# Patient Record
Sex: MALE | Race: AMERICAN INDIAN OR ALASKA NATIVE
[De-identification: names, ages, dates, MRNs, and addresses within clinical notes are randomized per-mention and may not be internally consistent; named-entity substitution may affect disease eponyms.]

---

## 2020-03-23 ENCOUNTER — HOSPITAL ENCOUNTER (EMERGENCY)
Dept: HOSPITAL 5 - ED | Age: 45
LOS: 1 days | Discharge: HOME | End: 2020-03-24
Payer: SELF-PAY

## 2020-03-23 DIAGNOSIS — E11.9: ICD-10-CM

## 2020-03-23 DIAGNOSIS — Z79.899: ICD-10-CM

## 2020-03-23 DIAGNOSIS — N39.0: ICD-10-CM

## 2020-03-23 DIAGNOSIS — K80.20: Primary | ICD-10-CM

## 2020-03-23 DIAGNOSIS — E86.0: ICD-10-CM

## 2020-03-23 LAB
ALBUMIN SERPL-MCNC: 3.8 G/DL (ref 3.9–5)
ALT SERPL-CCNC: 17 UNITS/L (ref 7–56)
BASOPHILS # (AUTO): 0.1 K/MM3 (ref 0–0.1)
BASOPHILS NFR BLD AUTO: 0.9 % (ref 0–1.8)
BILIRUB UR QL STRIP: (no result)
BLOOD UR QL VISUAL: (no result)
BUN SERPL-MCNC: 21 MG/DL (ref 9–20)
BUN/CREAT SERPL: 11 %
CALCIUM SERPL-MCNC: 9.4 MG/DL (ref 8.4–10.2)
EOSINOPHIL # BLD AUTO: 0.2 K/MM3 (ref 0–0.4)
EOSINOPHIL NFR BLD AUTO: 2.3 % (ref 0–4.3)
HCT VFR BLD CALC: 38 % (ref 35.5–45.6)
HEMOLYSIS INDEX: 6
HGB BLD-MCNC: 12.8 GM/DL (ref 11.8–15.2)
LYMPHOCYTES # BLD AUTO: 2.7 K/MM3 (ref 1.2–5.4)
LYMPHOCYTES NFR BLD AUTO: 30.2 % (ref 13.4–35)
MCHC RBC AUTO-ENTMCNC: 34 % (ref 32–34)
MCV RBC AUTO: 85 FL (ref 84–94)
MONOCYTES # (AUTO): 0.7 K/MM3 (ref 0–0.8)
MONOCYTES % (AUTO): 7.4 % (ref 0–7.3)
PH UR STRIP: 5 [PH] (ref 5–7)
PLATELET # BLD: 231 K/MM3 (ref 140–440)
RBC # BLD AUTO: 4.47 M/MM3 (ref 3.65–5.03)
RBC #/AREA URNS HPF: 5 /HPF (ref 0–6)
UROBILINOGEN UR-MCNC: < 2 MG/DL (ref ?–2)
WBC #/AREA URNS HPF: 8 /HPF (ref 0–6)

## 2020-03-23 PROCEDURE — 80053 COMPREHEN METABOLIC PANEL: CPT

## 2020-03-23 PROCEDURE — 36415 COLL VENOUS BLD VENIPUNCTURE: CPT

## 2020-03-23 PROCEDURE — 87086 URINE CULTURE/COLONY COUNT: CPT

## 2020-03-23 PROCEDURE — 96361 HYDRATE IV INFUSION ADD-ON: CPT

## 2020-03-23 PROCEDURE — 74176 CT ABD & PELVIS W/O CONTRAST: CPT

## 2020-03-23 PROCEDURE — 83690 ASSAY OF LIPASE: CPT

## 2020-03-23 PROCEDURE — 85025 COMPLETE CBC W/AUTO DIFF WBC: CPT

## 2020-03-23 PROCEDURE — 96375 TX/PRO/DX INJ NEW DRUG ADDON: CPT

## 2020-03-23 PROCEDURE — 81001 URINALYSIS AUTO W/SCOPE: CPT

## 2020-03-23 PROCEDURE — 96374 THER/PROPH/DIAG INJ IV PUSH: CPT

## 2020-03-23 PROCEDURE — 99284 EMERGENCY DEPT VISIT MOD MDM: CPT

## 2020-03-23 NOTE — EMERGENCY DEPARTMENT REPORT
Blank Doc





- Documentation


Documentation: 





44-year-old male that presents with right flank pain.





This initial assessment/diagnostic orders/clinical plan/treatment(s) is/are 

subject to change based on patient's health status, clinical progression and re-

assessment by fellow clinical providers in the ED.  Further treatment and workup

at subsequent clinical providers discretion.  Patient/guardians urged not to 

elope from the ED as their condition may be serious if not clinically assessed 

and managed.  Initial orders include:


1- Patient sent to [facility] for further evaluation and treatment


2- UA

## 2020-03-24 VITALS — DIASTOLIC BLOOD PRESSURE: 98 MMHG | SYSTOLIC BLOOD PRESSURE: 158 MMHG

## 2020-03-24 NOTE — CAT SCAN REPORT
CT abdomen pelvis wo con 



INDICATION / CLINICAL INFORMATION:

RENAL STONE PROTOCOL!!  Right sided flank / abd pain..



TECHNIQUE:

All CT scans at this location are performed using CT dose reduction for ALARA by means of automated e
xposure control. 



COMPARISON:

None available.



FINDINGS:

No free fluid is seen in the abdomen. No urinary tract stones are present. High density material is s
een in the gallbladder most likely representing numerous small stones. The liver, spleen, pancreas, a
drenal glands and great vessels are normal. No enlarged mesenteric or retroperitoneal lymph nodes are
 seen



In the pelvis, no free fluid is seen. No enlarged lymph nodes are identified. The bladder and the fredy
endix are normal.



IMPRESSION:

1. High density material in the gallbladder most likely representing numerous small stones

2. No acute findings



Signer Name: Osito Hernandez MD FACR 

Signed: 3/24/2020 12:13 AM

Workstation Name: [id number]

## 2020-03-24 NOTE — EMERGENCY DEPARTMENT REPORT
ED Abdominal Pain HPI





- General


Chief Complaint: Back Pain/Injury


Stated Complaint: BACK PAIN, STOMACH


Time Seen by Provider: 20 18:56


Source: patient


Mode of arrival: Ambulatory


Limitations: No Limitations





- History of Present Illness


Initial Comments: 


Mr. Roy is a 45 y/o aam with hx of DMII, and HTN ,who presents of flank, back 

, and abd pain x 4 days hx of same 6 months, ago pt complains of associated 

nausea and vomiiting, symptoms are exacerbated by po intake, symptoms are 

relieved by nothing tried. pt denies fever or chills. 


MD Complaint: abdominal pain, flank pain


Onset/Timin


-: days(s)


Location: R flank


Radiation: back


Migration to: no migration


Severity: moderate


Severity scale (0 -10): 6


Quality: aching, sharp


Consistency: intermittent


Improves With: nothing


Worsens With: eating, movement, other (voiding)


Associated Symptoms: nausea, vomiting, dysuria





- Related Data


                                  Previous Rx's











 Medication  Instructions  Recorded  Last Taken  Type


 


Ciprofloxacin HCl [Ciprofloxacin 500 mg PO BID 10 Days #20 tab 20 Unknown 

Rx





TAB]    


 


Omeprazole 40 mg PO DAILY #30 capsule. 20 Unknown Rx











                                    Allergies











Allergy/AdvReac Type Severity Reaction Status Date / Time


 


No Known Allergies Allergy   Unverified 20 23:46














ED Review of Systems


ROS: 


Stated complaint: BACK PAIN, STOMACH


Other details as noted in HPI





Constitutional: denies: chills, fever


Eyes: denies: eye pain, eye discharge, vision change


ENT: denies: ear pain, throat pain


Respiratory: denies: cough, shortness of breath, wheezing


Cardiovascular: denies: chest pain, palpitations


Endocrine: no symptoms reported


Gastrointestinal: abdominal pain, nausea, vomiting.  denies: diarrhea, 

constipation, melena


Genitourinary: urgency, dysuria, frequency.  denies: hematuria, discharge


Musculoskeletal: back pain


Skin: denies: rash, lesions


Neurological: denies: headache, weakness, paresthesias


Psychiatric: denies: anxiety, depression


Hematological/Lymphatic: denies: easy bleeding, easy bruising





ED Past Medical Hx





- Past Medical History


Previous Medical History?: Yes


Hx Diabetes: Yes





- Surgical History


Past Surgical History?: No





- Social History


Smoking Status: Never Smoker


Substance Use Type: None





- Medications


Home Medications: 


                                Home Medications











 Medication  Instructions  Recorded  Confirmed  Last Taken  Type


 


Ciprofloxacin HCl [Ciprofloxacin 500 mg PO BID 10 Days #20 tab 20  Unknown

 Rx





TAB]     


 


Omeprazole 40 mg PO DAILY #30 capsule. 20  Unknown Rx














ED Physical Exam





- General


Limitations: No Limitations


General appearance: alert, in no apparent distress





- Head


Head exam: Present: atraumatic, normocephalic, normal inspection





- Eye


Eye exam: Present: normal appearance, PERRL, EOMI


Pupils: Present: normal accommodation





- ENT


ENT exam: Present: mucous membranes moist





- Neck


Neck exam: Present: normal inspection, tenderness, full ROM.  Absent: lymphad

enopathy





- Respiratory


Respiratory exam: Present: normal lung sounds bilaterally.  Absent: respiratory 

distress, wheezes, stridor, chest wall tenderness





- Cardiovascular


Cardiovascular Exam: Present: regular rate, normal rhythm, normal heart sounds. 

Absent: systolic murmur, diastolic murmur, rubs, gallop





- GI/Abdominal


GI/Abdominal exam: Present: soft, tenderness (super pubic right flank), normal 

bowel sounds.  Absent: distended, guarding, rebound, rigid, bruit, hernia





- Rectal


Rectal exam: Present: deferred





- Extremities Exam


Extremities exam: Present: normal inspection, full ROM, normal capillary refill.

 Absent: tenderness





- Back Exam


Back exam: Present: normal inspection, full ROM, tenderness, CVA tenderness (R).

 Absent: CVA tenderness (L), vertebral tenderness, rash noted





- Neurological Exam


Neurological exam: Present: alert, oriented X3, CN II-XII intact, normal gait





- Psychiatric


Psychiatric exam: Present: normal affect, normal mood





- Skin


Skin exam: Present: warm, dry, intact, normal color.  Absent: rash





ED Course


                                   Vital Signs











  20





  18:56 23:46


 


Temperature 97.9 F 


 


Pulse Rate 97 H 


 


Respiratory 18 16





Rate  


 


Blood Pressure 167/112 


 


O2 Sat by Pulse 98 





Oximetry  














ED Medical Decision Making





- Lab Data


Result diagrams: 


                                 20 19:13





                                 20 19:13








Labs











  20





  19:13 19:13 Unknown


 


WBC  8.9  


 


RBC  4.47  


 


Hgb  12.8  


 


Hct  38.0  


 


MCV  85  


 


MCH  29  


 


MCHC  34  


 


RDW  14.2  


 


Plt Count  231  


 


Lymph % (Auto)  30.2  


 


Mono % (Auto)  7.4 H  


 


Eos % (Auto)  2.3  


 


Baso % (Auto)  0.9  


 


Lymph #  2.7  


 


Mono #  0.7  


 


Eos #  0.2  


 


Baso #  0.1  


 


Seg Neutrophils %  59.2  


 


Seg Neutrophils #  5.3  


 


Sodium   140 


 


Potassium   4.3 


 


Chloride   100.2 


 


Carbon Dioxide   25 


 


Anion Gap   19 


 


BUN   21 H 


 


Creatinine   1.9 H 


 


Estimated GFR   39 


 


BUN/Creatinine Ratio   11 


 


Glucose   167 H 


 


Calcium   9.4 


 


Total Bilirubin   0.60 


 


AST   16 


 


ALT   17 


 


Alkaline Phosphatase   83 


 


Total Protein   8.3 H 


 


Albumin   3.8 L 


 


Albumin/Globulin Ratio   0.8 


 


Lipase   581 H 


 


Urine Color    Yellow


 


Urine Turbidity    Clear


 


Urine pH    5.0


 


Ur Specific Gravity    1.016


 


Urine Protein    100 mg/dl


 


Urine Glucose (UA)    150


 


Urine Ketones    Neg


 


Urine Blood    Sm


 


Urine Nitrite    Neg


 


Urine Bilirubin    Neg


 


Urine Urobilinogen    < 2.0


 


Ur Leukocyte Esterase    Neg


 


Urine WBC (Auto)    8.0 H


 


Urine RBC (Auto)    5.0














- Radiology Data


Radiology results: report reviewed, image reviewed


Findings


Reporting MD: Osito Hernandez Dictation Time: 2020 23:13 

: Not available Transcription Date:


 


CT abdomen pelvis wo con  


 


INDICATION / CLINICAL INFORMATION:  RENAL STONE PROTOCOL!! Right sided flank / a

bd pain..  


 


TECHNIQUE:  All CT scans at this location are performed using CT dose reduction 

for ALARA by means of automated exposure control.  


 


COMPARISON:  None available.  


 


FINDINGS:  No free fluid is seen in the abdomen. No urinary tract stones are 

present. High density material is seen in the gallbladder most likely 

representing numerous small stones. The liver, spleen, pancreas, adrenal glands 

and great vessels are normal. No enlarged mesenteric or retroperitoneal lymph 

nodes are seen  


 


In the pelvis, no free fluid is seen. No enlarged lymph nodes are identified. 

The bladder and the appendix are normal.  


 


IMPRESSION:  1. High density material in the gallbladder most likely rep

resenting numerous small stones  2. No acute findings  


 


Signer Name: Osito Hernandez MD FACR  Signed: 3/23/2020 11:13 PM  Workstation 

Name: [id number]








- Medical Decision Making


CT demonstrates gallstones, normal pancreas, no cholecystitis no colitis, Lipase

583, patient is tolerating p.o. intake without nausea vomiting.  CBC is normal. 

UA positive for leukocytes.  Plan Cipro, omeprazole, naproxen as needed pain, 

patient will follow-up with PCP in 2 to 3 days, patient given referral to 

[facility].  Patient states pain is 1/10 at this time and ready f

or discharge.  Patient is DC to home in stable condition at this time.


Critical care attestation.: 


If time is entered above; I have spent that time in minutes in the direct care 

of this critically ill patient, excluding procedure time.








ED Disposition


Clinical Impression: 


 Mild dehydration





Cholelithiasis


Qualifiers:


 Cholelithiasis location: gallbladder Cholecystitis presence: without 

cholecystitis Biliary obstruction: without biliary obstruction Qualified 

Code(s): K80.20 - Calculus of gallbladder without cholecystitis without 

obstruction





UTI (urinary tract infection)


Qualifiers:


 Urinary tract infection type: acute cystitis Hematuria presence: without 

hematuria Qualified Code(s): N30.00 - Acute cystitis without hematuria





Disposition: DC-01 TO HOME OR SELFCARE


Is pt being admited?: No


Does the pt Need Aspirin: No


Condition: Stable


Instructions:  Cholelithiasis (ED), Dehydration (ED)


Additional Instructions: 


take bp medication as prescribed, Stop ETOH, Take medications as prescribed, 

follolw up with Gastrenterology , follow up with your primary care doctor. 

hydrate as discussed. 


Prescriptions: 


Ciprofloxacin HCl [Ciprofloxacin TAB] 500 mg PO BID 10 Days #20 tab


Omeprazole 40 mg PO DAILY #30 capsule.


Referrals: 


PRIMARY CARE,MD [Primary Care Provider] - 3-5 Days


Forms:  Work/School Release Form(ED)


Time of Disposition: 03:26

## 2021-05-19 NOTE — CAT SCAN REPORT
CT HEAD WITHOUT CONTRAST



INDICATION : 

Stroke symptoms.



TECHNIQUE:  Axial, coronal and sagittal CT imaging was performed from the skull apex through the skul
l base without contrast.  All CT scans at this location are performed using CT dose reduction for ALA
RA by means of automated exposure control. 



COMPARISON:  None available.



FINDINGS:  



PARENCHYMA:  Acute intraparenchymal hematoma is seen along the left parietal lobe on image 20 of seri
es 2 measuring 2.5 x 2.1 cm with mild surrounding edema. No other sites of hemorrhage. No extra-axial
 collection. No mass or midline shift.

VENTRICLES:  Symmetric and normal in size.  

SOFT TISSUES:  No significant abnormality of the included soft tissues/orbits.  

BONES:  No acute osseous abnormality.   

SINUSES: No significant abnormality. 

ADDITIONAL FINDINGS: None. 



IMPRESSION: 

Acute left parietal intraparenchymal hemorrhage as above.





============================================

CODE STROKE:

Time of Communication (CST/CDT): 01:20

Licensed Practitioner Receiving Report: Dr. Blank 







============================================



Signer Name: Ash Moreno MD 

Signed: 5/19/2021 2:21 AM

Workstation Name: [id number]

## 2021-05-19 NOTE — EMERGENCY DEPARTMENT REPORT
ED Neuro Deficit HPI





- General


Stated Complaint: STROKE


Time Seen by Provider: 05/19/21 01:53


Source: patient, EMS





- History of Present Illness


Initial Comments: 





Patient is 46 years old male with history of hypertension and diabetes.  Patient

brought to the emergency room as a code stroke.  Patient symptoms started 

approximately 2 hours prior to coming to the emergency room with a sudden onset 

of inability to speak and right facial droop.  Patient denied any headache or 

neck pain.  Patient denied any visual changes.  No ataxia.  No extremity 

weakness.





Patient immediately moved to CT for stat CT brain without contrast.  Stroke 

telemetry neurology Dr. Kahkeshani immediately consulted.  He examined the 

patient through video conference.  CT brain showed intracranial bleed.  He 

advised to start patient on Cardene drip and gave patient 1 g of Keppra and 

patient to be transferred to neurosurgery center.


-: Sudden, hour(s) (2)


Location: speech, right face


Presenting Symptoms: Present: Facial Droop/Numbness, Unable to Speak Clearly


History of same: No


Place: home


Context: sudden onset





- Related Data


Home Medications: 


                                  Previous Rx's











 Medication  Instructions  Recorded  Last Taken  Type


 


Ciprofloxacin HCl [Ciprofloxacin 500 mg PO BID 10 Days #20 tab 03/24/20 Unknown 

Rx





TAB]    


 


Omeprazole 40 mg PO DAILY #30 capsule. 03/24/20 Unknown Rx











Allergies/Adverse Reactions: 


                                    Allergies











Allergy/AdvReac Type Severity Reaction Status Date / Time


 


No Known Allergies Allergy   Verified 05/19/21 02:17














ED Review of Systems


ROS: 


Stated complaint: STROKE


Other details as noted in HPI





Comment: All other systems reviewed and negative


Constitutional: denies: chills, fever


Respiratory: denies: cough, shortness of breath


Cardiovascular: denies: chest pain, palpitations


Gastrointestinal: denies: abdominal pain


Musculoskeletal: denies: back pain


Neurological: denies: headache, weakness





ED Past Medical Hx





- Past Medical History


Hx Diabetes: Yes





- Social History


Smoking Status: Never Smoker


Substance Use Type: None





- Medications


Home Medications: 


                                Home Medications











 Medication  Instructions  Recorded  Confirmed  Last Taken  Type


 


Ciprofloxacin HCl [Ciprofloxacin 500 mg PO BID 10 Days #20 tab 03/24/20  Unknown

 Rx





TAB]     


 


Omeprazole 40 mg PO DAILY #30 capsule. 03/24/20  Unknown Rx














ED Neuro Physical Exam





- General


General appearance: alert, in no apparent distress


Suspected Stroke: Yes





- Head


Head exam: Present: atraumatic, normocephalic, normal inspection





- Eye


Eye exam: Present: normal appearance, PERRL





- ENT


ENT exam: Present: normal exam, normal orophraynx, mucous membranes moist





- Neck


Neck exam: Present: normal inspection, full ROM.  Absent: tenderness, 

meningismus





- Respiratory


Respiratory exam: Present: normal lung sounds bilaterally





- Cardiovascular


Cardiovascular Exam: Present: regular rate, normal rhythm, normal heart sounds





- GI/Abdominal


GI/Abdominal exam: Present: soft, normal bowel sounds.  Absent: distended, 

tenderness, guarding, rebound, rigid, organomegaly, mass, bruit, pulsatile mass,

hernia





- Extremities Exam


Extremities exam: Present: normal inspection, full ROM, normal capillary refill.

 Absent: tenderness





- Back Exam


Back exam: Present: normal inspection, full ROM.  Absent: CVA tenderness (R), 

CVA tenderness (L)





- Neurological Exam


Neurological exam: Present: alert, oriented X3.  Absent: CN II-XII intact, motor

sensory deficit





- NIHSS


Assessment Interval: Baseline


1a. Level of Consciousness: alert/keenly responsive


1b. LOC Questions: answers no questions correctly


1c. LOC Commands: performs tasks correctly


2. Best Gaze: normal


3. Visual: no visual loss


4. Facial Palsy: partial paralysis


5b. Motor Arm Right: no drift


5a. Motor Arm Left: no drift


6a. Motor Leg Left: no drift


6b. Motor Leg Right: no drift


7. Limb Ataxia: absent


8. Sensory: normal


9. Best Language: mute/global aphasia


10. Dysarthria: normal


11. Extinction/Inattention: no abnormality


Total Score: 7


Stroke Severity: Moderate Stroke





- Psychiatric


Psychiatric exam: Present: flat affect





- Skin


Skin exam: Present: warm, dry, intact





ED Course


                                   Vital Signs











  05/19/21





  02:25


 


Temperature 99.1 F


 


Pulse Rate 110 H


 


Respiratory 16





Rate 


 


Blood Pressure 207/107





[Left] 


 


O2 Sat by Pulse 99





Oximetry 














- Consultations


Consultation #1: 





05/19/21 02:33


I discussed the patient with Dr. Brown, neurologist at [facility]

 and he stated that they do not have any beds available and advised to try other

 facilities.





- Lab Data


Result diagrams: 


                                 05/19/21 02:24





                                   Lab Results











  05/19/21 Range/Units





  02:24 


 


WBC  10.7  (4.5-11.0)  K/mm3


 


RBC  3.73  (3.65-5.03)  M/mm3


 


Hgb  11.0 L  (11.8-15.2)  gm/dl


 


Hct  31.7 L  (35.5-45.6)  %


 


MCV  85  (84-94)  fl


 


MCH  29  (28-32)  pg


 


MCHC  35 H  (32-34)  %


 


RDW  14.4  (13.2-15.2)  %


 


Plt Count  202  (140-440)  K/mm3


 


Lymph % (Auto)  21.8  (13.4-35.0)  %


 


Mono % (Auto)  5.6  (0.0-7.3)  %


 


Eos % (Auto)  0.9  (0.0-4.3)  %


 


Baso % (Auto)  0.5  (0.0-1.8)  %


 


Lymph # (Auto)  2.3  (1.2-5.4)  K/mm3


 


Mono # (Auto)  0.6  (0.0-0.8)  K/mm3


 


Eos # (Auto)  0.1  (0.0-0.4)  K/mm3


 


Baso # (Auto)  0.1  (0.0-0.1)  K/mm3


 


Seg Neutrophils %  71.2 H  (40.0-70.0)  %


 


Seg Neutrophils #  7.6  (1.8-7.7)  K/mm3














- EKG Data


-: EKG Interpreted by Me


EKG shows normal: sinus rhythm


Rate: normal


Interpretation: no acute changes





- Radiology Data


Radiology results: report reviewed





- Medical Decision Making





Patient is 46 years old male with history of hypertension and diabetes.  Patient

 brought to the emergency room as a code stroke.  Patient symptoms started 

approximately 2 hours prior to coming to the emergency room with a sudden onset 

of inability to speak and right facial droop.  Patient denied any headache or 

neck pain.  Patient denied any visual changes.  No ataxia.  No extremity 

weakness.





Patient immediately moved to CT for stat CT brain without contrast.  Stroke 

telemetry neurology Dr. Kahkeshani immediately consulted.  He examined the 

patient through video conference.  CT brain showed intracranial bleed.  He 

advised to start patient on Cardene drip and gave patient 1 g of Keppra and 

patient to be transferred to neurosurgery center.





I discussed the patient with Dr. Cruz at [facility], he accepted the 

patient to be transfer for further management.


Critical Care Time: Yes


Critical care time in (mins) excluding proc time.: 45


Critical care attestation.: 


If time is entered above; I have spent that time in minutes in the direct care 

of this critically ill patient, excluding procedure time.








ED Disposition


Clinical Impression: 


 Intracranial hemorrhage





Disposition: DC/TX-70 ANOTHER TYPE HLTHCARE


Is pt being admited?: No


Condition: Stable


Referrals: 


PRIMARY CARE,MD [Primary Care Provider] - 3-5 Days

## 2021-05-19 NOTE — CAT SCAN REPORT
CT angio neck



INDICATION / CLINICAL INFORMATION:

46 years Male; CODE STROKE PROTOCOL!!  Stroke-Like Symptoms. 



TECHNIQUE: Thin cut axial images obtained through the head during IV bolus contrast administration. S
agittal, coronal, and 3 plane MIP reconstructions performed by the technologist. NASCET type criteria
 used evaluate stenoses. All CT scans at this location are performed using CT dose reduction for ALAR
A by means of automated exposure control. 



COMPARISON:

None available.



FINDINGS: 



CAROTID ARTERIES: There is minimal atherosclerotic plaque with small focus of calcification involving
 right carotid bifurcation. However, there is no significant stenosis of the carotid arteries bilater
ally by NASCET criteria. There are small focus of calcification involving distal left common carotid 
artery also without significant stenosis.



VERTEBRAL ARTERIES: The motion and beam hardening degrade the image quality. However, there is no sig
nificant stenosis involving the vertebral arteries bilaterally. 



ARCH: There is no significant stenosis involving arch vessels.



ADDITIONAL FINDINGS: Remainder of the surrounding soft tissues are grossly normal. 



IMPRESSION:



There is no significant stenosis involving carotid or vertebral arteries by NASCET criteria.



Signer Name: Milton Peres MD 

Signed: 5/19/2021 2:47 AM

Workstation Name: [id number]

## 2021-05-19 NOTE — CAT SCAN REPORT
CT angio head



INDICATION / CLINICAL INFORMATION:

46 years Male; CODE STROKE PROTOCOL!!  Stroke-Like Symptoms. 



TECHNIQUE: Thin cut axial images obtained through the head during IV bolus contrast administration. S
agittal, coronal, and 3 plane MIP reconstructions performed by the technologist. NASCET type criteria
 used evaluate stenoses. Automated exposure control utilized for radiation reduction purposes. 



COMPARISON: 

None available.



FINDINGS:



INTERNAL CAROTID ARTERIES: There is no significant focal stenosis involving distal internal carotid a
rteries by NASCET criteria.



VERTEBROBASILAR SYSTEM: There are foci of calcification involving proximal intracranial vertebral art
eries without significant stenosis at. The basilar artery is unremarkable without focal narrowing.



CEREBRAL ARTERIES: There is no significant stenosis involving the proximal cerebral arteries or adjac
ent segments.



The findings correlate with the earlier CT head demonstrating an acute hemorrhage along the posterior
 left frontal lobe. However, this finding appears to increase in size given the short interval curren
tly measuring 2.4 cm AP by 2.6 cm transverse compared to 2.1 x 2.4 cm in respective projections on th
e earlier study. Additionally, there are not small foci of relative increased attenuation along the i
nferior portion which appears reflect extravasation of contrast and active bleeding. There is continu
ed mass effect with surrounding edema and sulcal effacement. The findings also result in mild displac
ement of the vessels within this region.



ANEURYSM: There is no clear CTA evidence of intracranial aneurysm.



ADDITIONAL FINDINGS: There is again note of irregular dural calcification along the frontal convexiti
es of bilaterally. 



IMPRESSION:

There appears be mild interval increase in size of the acute hematoma along the posterior left fronta
l lobe from the earlier CT head as detailed above. There are small foci of increased attenuation dirk
g the inferior margin of the hematoma indicative of extravasation of contrast and active bleeding.



There is no significant focal stenosis involving intracranial vessels. The above hematoma results in 
milder displacement of the vessels along the lateral frontoparietal region.



Signer Name: Milton Peres MD 

Signed: 5/19/2021 3:02 AM

Workstation Name: [id number]

## 2021-05-19 NOTE — XRAY REPORT
CHEST 1 VIEW 5/19/2021 1:23 AM



INDICATION / CLINICAL INFORMATION:

stroke.



COMPARISON: 

None available.



FINDINGS:



SUPPORT DEVICES: None.

HEART / MEDIASTINUM: No significant abnormality. 

LUNGS / PLEURA: Clear lungs. No significant pleural effusion. No pneumothorax. 



ADDITIONAL FINDINGS: No significant additional findings.



IMPRESSION:

1. No acute abnormality of the chest.



Signer Name: Ash Moreno MD 

Signed: 5/19/2021 2:29 AM

Workstation Name: [id number]

## 2021-05-19 NOTE — ELECTROCARDIOGRAPH REPORT
[facility]

                                       

Test Date:    2021               Test Time:    02:14:20

Pat Name:     CHARLES GASPAR              Department:   

Patient ID:   [id number]          Room:          

Gender:       M                        Technician:   MARLENY

:          1975               Requested By: DAREK SNELL

Order Number: [id number]              Reading MD:   Emmanuel Mathews

                                 Measurements

Intervals                              Axis          

Rate:         111                      P:            66

WY:           118                      QRS:          9

QRSD:         107                      T:            185

QT:           339                                    

QTc:          463                                    

                           Interpretive Statements

Sinus tachycardia

Multiple ventricular premature complexes

Probable left atrial enlargement

LVH with secondary repolarization abnormality

No previous ECG available for comparison

Electronically Signed On 2021 10:51:15 EDT by Emmanuel Mathews

## 2021-05-19 NOTE — EMERGENCY DEPARTMENT REPORT
HPI





- General


Time Seen by Provider: 05/19/21 01:53





- HPI


HPI: 





[facility] Consult Note





# Demographics


Consult Type: Acute Stroke Level 1 (0-4.5 hrs)


Patient Location: Emergency Room


First Name: Freddy


Last Name: Kirill


YOB: 1975


Age: 46


Gender: Male


Time of Initial Page (Eastern Time): 05/19/2021, 01:50


Time of Return Call (Eastern Time): 05/19/2021, 01:51





# HPI


History: 47 yo hx of DM on insulin.


EMS noted 2 hr history of right facial droop, difficulty speaking.





# Scores


Time of exam and NIHSS (Eastern Time): 05/19/2021, 02:09


Level of Consciousness 1a: [0] = Alert; keenly responsive


LOC Questions 1b: [2] = Answers neither correctly


LOC Commands 1c: [2] = Performs neither correctly


Best Gaze 2: [0] = Normal


Visual 3: [0] = No visual loss


Facial Palsy 4: [0] = Normal symmetrical movements


Motor Arm Left 5a: [0] = No drift


Motor Arm Right 5b: [0] = No drift


Motor Leg Left 6a: [0] = No drift


Motor Leg Right 6b: [0] = No drift


Limb Ataxia 7: [0] = Absent


Sensory 8: [0] = Normal


Best Language 9: [2] = Severe aphasia


Dysarthria 10: [0] = Normal


Extinction and Inattention 11: [0] = No abnormality


NIHSS Total: 6





# Data


Time Head CT personally read by me (Eastern Time): 05/19/2021, 01:58


Head CT: hemorrhage





# Assessment


Impression: intracranial hemorrhage





# Plan


Thrombolytic/Intervention: NOT IV Thrombolytic or IA Intervention


Thrombolytic Exclusion (< 3 hour window): ICH


Intraarterial Exclusion: ICH


Target Blood Pressure: SBP < 160


Additional Recommendations: transfer to neurosurgical center


f/u CTA to rule out AVM or other vascular anomaly.


keppra 1gm now


SBP < 160





# Logistics


Telemedicine: Interactive 2 way audio and visual telecommunication technology 

was utilized during this visit











ED Past Medical Hx





- Past Medical History


Hx Diabetes: Yes





- Social History


Smoking Status: Never Smoker


Substance Use Type: None





- Medications


Home Medications: 


                                Home Medications











 Medication  Instructions  Recorded  Confirmed  Last Taken  Type


 


Ciprofloxacin HCl [Ciprofloxacin 500 mg PO BID 10 Days #20 tab 03/24/20  Unknown

 Rx





TAB]     


 


Omeprazole 40 mg PO DAILY #30 capsule. 03/24/20  Unknown Rx














ED Review of Systems


ROS: 


Stated complaint: STROKE


Other details as noted in HPI





Critical care attestation.: 


If time is entered above; I have spent that time in minutes in the direct care 

of this critically ill patient, excluding procedure time.








ED Disposition


Clinical Impression: 


 Intracranial hemorrhage





Disposition: [id number] ANOTHER TYPE HLTHCARE


Is pt being admited?: No


Does the pt Need Aspirin: No


Condition: Stable